# Patient Record
Sex: MALE | Employment: OTHER | URBAN - METROPOLITAN AREA
[De-identification: names, ages, dates, MRNs, and addresses within clinical notes are randomized per-mention and may not be internally consistent; named-entity substitution may affect disease eponyms.]

---

## 2018-10-15 ENCOUNTER — TELEPHONE (OUTPATIENT)
Dept: PULMONOLOGY | Facility: CLINIC | Age: 53
End: 2018-10-15

## 2018-10-15 NOTE — TELEPHONE ENCOUNTER
Patient has been referred to you for paralyzed right hemidiaphragm by provider at [facility]. I advised if you are able to see him it would be the week of Halloween. He said he will be in [place] that week for work. Please advise.

## 2018-10-15 NOTE — TELEPHONE ENCOUNTER
----- Message from Nancy Stovall sent at 10/15/2018  4:01 PM CDT -----  Contact:   Pt   [phone]  Needs Advice    Reason for call:  New  Patient  Seeking an appt         Communication Preference:  Phone     Additional Information:

## 2018-11-09 ENCOUNTER — HOSPITAL ENCOUNTER (OUTPATIENT)
Dept: PULMONOLOGY | Facility: CLINIC | Age: 53
Discharge: HOME | End: 2018-11-09
Payer: COMMERCIAL

## 2018-11-09 ENCOUNTER — OFFICE VISIT (OUTPATIENT)
Dept: PULMONOLOGY | Facility: CLINIC | Age: 53
End: 2018-11-09
Payer: COMMERCIAL

## 2018-11-09 VITALS
WEIGHT: 181.69 LBS | HEIGHT: 74 IN | HEART RATE: 106 BPM | DIASTOLIC BLOOD PRESSURE: 86 MMHG | BODY MASS INDEX: 23.32 KG/M2 | SYSTOLIC BLOOD PRESSURE: 120 MMHG | OXYGEN SATURATION: 95 %

## 2018-11-09 DIAGNOSIS — G70.9 NEUROMUSCULAR RESPIRATORY WEAKNESS: ICD-10-CM

## 2018-11-09 DIAGNOSIS — J99 NEUROMUSCULAR RESPIRATORY WEAKNESS: Primary | ICD-10-CM

## 2018-11-09 DIAGNOSIS — J96.10 CHRONIC NEUROMUSCULAR RESPIRATORY FAILURE: ICD-10-CM

## 2018-11-09 DIAGNOSIS — R91.8 ABNORMAL FINDINGS ON DIAGNOSTIC IMAGING OF LUNG: ICD-10-CM

## 2018-11-09 DIAGNOSIS — J98.6 PARALYZED HEMIDIAPHRAGM: ICD-10-CM

## 2018-11-09 DIAGNOSIS — J99 NEUROMUSCULAR RESPIRATORY WEAKNESS: ICD-10-CM

## 2018-11-09 DIAGNOSIS — J45.909 MILD ASTHMA, UNSPECIFIED WHETHER COMPLICATED, UNSPECIFIED WHETHER PERSISTENT: Primary | ICD-10-CM

## 2018-11-09 DIAGNOSIS — G70.9 NEUROMUSCULAR RESPIRATORY WEAKNESS: Primary | ICD-10-CM

## 2018-11-09 LAB
POST FEV1 FVC: 0.77
POST FEV1: 1.53
POST FVC: 2
PRE FEV1 FVC: 63
PRE FEV1 FVC: 73
PRE FEV1: 0.91
PRE FEV1: 1.38
PRE FVC: 1.45
PRE FVC: 1.88
PREDICTED FEV1 FVC: 79
PREDICTED FEV1 FVC: 79
PREDICTED FEV1: 4.42
PREDICTED FEV1: 4.42
PREDICTED FVC: 5.46
PREDICTED FVC: 5.46

## 2018-11-09 PROCEDURE — 94010 BREATHING CAPACITY TEST: CPT | Mod: S$GLB,,, | Performed by: INTERNAL MEDICINE

## 2018-11-09 PROCEDURE — 3008F BODY MASS INDEX DOCD: CPT | Mod: CPTII,S$GLB,, | Performed by: INTERNAL MEDICINE

## 2018-11-09 PROCEDURE — 99204 OFFICE O/P NEW MOD 45 MIN: CPT | Mod: S$GLB,,, | Performed by: INTERNAL MEDICINE

## 2018-11-09 PROCEDURE — 94729 DIFFUSING CAPACITY: CPT | Mod: S$GLB,,, | Performed by: INTERNAL MEDICINE

## 2018-11-09 PROCEDURE — 94727 GAS DIL/WSHOT DETER LNG VOL: CPT | Mod: S$GLB,,, | Performed by: INTERNAL MEDICINE

## 2018-11-09 PROCEDURE — 99999 PR PBB SHADOW E&M-EST. PATIENT-LVL III: CPT | Mod: PBBFAC,,, | Performed by: INTERNAL MEDICINE

## 2018-11-09 RX ORDER — FLUTICASONE FUROATE AND VILANTEROL 200; 25 UG/1; UG/1
1 POWDER RESPIRATORY (INHALATION) DAILY
Qty: 1 EACH | Refills: 5 | Status: SHIPPED | OUTPATIENT
Start: 2018-11-09 | End: 2019-05-22 | Stop reason: SDUPTHER

## 2018-11-09 NOTE — PROGRESS NOTES
CHIEF COMPLAINT:  No chief complaint on file.    HISTORY OF PRESENT ILLNESS: Raghu Sultana III is a 53 y.o. male with a history of melanoma, s/p wide excision from midback with negative left axillary nodes, in January 2018, who experienced pain in right upper back on 8/20/18 after abrupt rotation of torso when unexpectedly pulled by a dog while walking with left arm around girlfriend.  After that time noted shortness of breath with exertion and difficulty lying down on back due to shortness of breath.  Sleeps on side since that time.  Was evaluated for asthma and empirically treated 5 years ago for exercise limitation in adult soccer league, without improvement.  Notes since august has had sensation of asthma, with wheezing, cough.  Not taking any bronchodilators at this time.  Other relevant history is pectus excavatum repair at age 1 with rib fractures as therapy in right upper chest.  No smoking history.  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  Weight stable, no recurrent infections.    CT at [facility] 9/18/18 with elevated right hemidiaphragm.        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 Not .  Social History     Tobacco Use   Smoking Status Never Smoker   Smokeless Tobacco Never Used       ALLERGIES:    Review of patient's allergies indicates:   Allergen Reactions    Fish containing products        CURRENT MEDICATIONS:    Current Outpatient Medications   Medication Sig Dispense Refill     "fluticasone-vilanterol (BREO ELLIPTA) 200-25 mcg/dose DsDv diskus inhaler Inhale 1 puff into the lungs once daily. Controller 1 each 5     No current facility-administered medications for this visit.                   REVIEW OF SYSTEMS:   Pulmonary related symptoms as per HPI.  Gen:  no weight loss, no fever, no night sweats  HEENT:  no sinus congestion, no dysphagia, no voice changes  CV:  no chest pain, + orthopnea, no paroxysmal nocturnal dyspnea  GI:  no melena, no hematochezia, no diarrhea, no constipation.  :  no dysuria, no hematuria, no incontinence  Neuro:  no syncope, no focal weakness, no numbness.  Sleep:  Sleeps on side, awakens due to difficulty breathing     PHYSICAL EXAM:   Respiratory Rate:    Vitals:    11/09/18 1100   BP: 120/86   Pulse: 106   SpO2: 95%   Weight: 82.4 kg (181 lb 10.5 oz)   Height: 6' 2" (1.88 m)     GENERAL:  Alert, calm, in no  distress  HEENT:  Normal pupils, normal conjunctiva, normal EOM, nasal and oral mucosa normal, tongue normal  NECK:  supple; no palpable lymphadenopathy or masses,no jugular venous distention.  CVS: regular rate and rhythm, no murmers, gallops or rubs  PULM: Grossly normal vital capacity, normal to percussion and palpation, clear to auscultation bilaterally with no wheezes, crackles or rhonchi, no accessory muscle use with inspiratory effort.  Well healed scar mid back  ABDOMEN:  soft nontender/nondistended, normal rise with inspiration, normal contraction with cough  EXTREMITIES no cyanosis, clubbing or edema  NEURO:  No focal weakness, ambulatory    CONTRIBUTORY STUDIES:     PULMONARY FUNCTION TESTS: FEV1/FVC 0.74  FEV1 31%  FVC 34%, TLC 39%, RV 47%  FRC 35%, borderline BD response (150 ml, 11%)        ACTIVE PULMONARY PROBLEMS:    ICD-10-CM ICD-9-CM    1. Mild asthma, unspecified whether complicated, unspecified whether persistent J45.998 493.90 fluticasone-vilanterol (BREO ELLIPTA) 200-25 mcg/dose DsDv diskus inhaler   2. Paralyzed hemidiaphragm " J98.6 519.4 HME - OTHER   3. Chronic neuromuscular respiratory failure J96.10 518.83 HME - OTHER      POCT capillary blood gas   4. Abnormal findings on diagnostic imaging of lung R91.8 793.19 CT Chest Without Contrast       ASSESSMENT&PLAN:  1.  Restrictive lung disease with orthopnea likely due to paralyzed hemidiaphragm.  By history the paralysis appears related to torso rotation in Aug 2018, without suspected nerve transection.  Recommend 12 month follow up for recovery before considering plication for this symptomatic problem.  2.  Will order NIV for orthopnea, restrictive neuromuscular disease consistent with chronic neuromuscular respiratory failure at this time. Needs to be portable for extensive travel/ use in hotels.  3.  Intermittent cough, wheezing - possible obstruction and borderline BD response- will treat for asthma at this time as relatively mild obstructive lung disease may be more symptomatic superimposed on diaphragmatic paralysis (increased pressure swings may accentuate paradoxical diaphragm movement).      Follow-up in about 3 months (around 2/9/2019).      [Greater than 50% of this 35 minute visit spent counseling patient and family]

## 2019-05-22 DIAGNOSIS — J45.909 MILD ASTHMA, UNSPECIFIED WHETHER COMPLICATED, UNSPECIFIED WHETHER PERSISTENT: ICD-10-CM

## 2019-05-24 RX ORDER — FLUTICASONE FUROATE AND VILANTEROL TRIFENATATE 200; 25 UG/1; UG/1
POWDER RESPIRATORY (INHALATION)
Qty: 1 EACH | Refills: 5 | Status: SHIPPED | OUTPATIENT
Start: 2019-05-24 | End: 2021-11-16